# Patient Record
Sex: MALE | Race: WHITE | URBAN - METROPOLITAN AREA
[De-identification: names, ages, dates, MRNs, and addresses within clinical notes are randomized per-mention and may not be internally consistent; named-entity substitution may affect disease eponyms.]

---
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
## 2020-01-08 ENCOUNTER — OFFICE VISIT (OUTPATIENT)
Dept: SURGERY | Facility: CLINIC | Age: 20
End: 2020-01-08
Payer: COMMERCIAL

## 2020-01-08 VITALS — TEMPERATURE: 97 F | SYSTOLIC BLOOD PRESSURE: 125 MMHG | HEART RATE: 78 BPM | DIASTOLIC BLOOD PRESSURE: 80 MMHG

## 2020-01-08 DIAGNOSIS — K60.3 PERIANAL FISTULA: Primary | ICD-10-CM

## 2020-01-08 PROCEDURE — 99214 OFFICE O/P EST MOD 30 MIN: CPT | Performed by: SURGERY

## 2020-01-09 NOTE — PROGRESS NOTES
[facility]  Progress Note    John Smith Patient Status:  No patient class for patient encounter    4/15/2000 MRN [id number]   Location [address] [place] [address],[id number] [facility] Attending No att. providers found   Hosp Day # 0 PCP Diony Madrid the pilonidal sinus which I do not believe shows evident any evidence of inflammation at this time and I do not believe has anything to do with his chronic drainage. I spent  44  minutes face to face with the patient.  More than 50% of that time was spent

## 2020-01-17 ENCOUNTER — LAB REQUISITION (OUTPATIENT)
Dept: LAB | Facility: HOSPITAL | Age: 20
End: 2020-01-17
Payer: COMMERCIAL

## 2020-01-17 DIAGNOSIS — L05.01 PILONIDAL CYST WITH ABSCESS: ICD-10-CM

## 2020-01-17 PROCEDURE — 88304 TISSUE EXAM BY PATHOLOGIST: CPT | Performed by: SURGERY

## 2020-01-20 ENCOUNTER — TELEPHONE (OUTPATIENT)
Dept: SURGERY | Facility: CLINIC | Age: 20
End: 2020-01-20

## 2020-01-23 ENCOUNTER — MED REC SCAN ONLY (OUTPATIENT)
Dept: SURGERY | Facility: CLINIC | Age: 20
End: 2020-01-23

## 2020-01-31 ENCOUNTER — OFFICE VISIT (OUTPATIENT)
Dept: SURGERY | Facility: CLINIC | Age: 20
End: 2020-01-31

## 2020-01-31 VITALS — BODY MASS INDEX: 32.2 KG/M2 | WEIGHT: 230 LBS | HEIGHT: 71 IN | HEART RATE: 102 BPM | TEMPERATURE: 99 F

## 2020-01-31 DIAGNOSIS — L05.91 PILONIDAL CYST: Primary | ICD-10-CM

## 2020-01-31 PROCEDURE — 99024 POSTOP FOLLOW-UP VISIT: CPT | Performed by: SURGERY

## 2020-02-04 ENCOUNTER — MED REC SCAN ONLY (OUTPATIENT)
Dept: SURGERY | Facility: CLINIC | Age: 20
End: 2020-02-04

## 2020-07-15 ENCOUNTER — TELEPHONE (OUTPATIENT)
Dept: FAMILY MEDICINE CLINIC | Facility: CLINIC | Age: 20
End: 2020-07-15

## 2020-07-15 NOTE — TELEPHONE ENCOUNTER
Pt has Nausea, vomiting, headache, diarrhea, tight chest, chills, Body aches and feels over all bad,   Wants to know if he can get COVID Tested. Started today, wasn't feeling good all week but got worse today.      Doesn't think he was around anyone who h

## 2020-07-15 NOTE — TELEPHONE ENCOUNTER
Patient advised of the information per Dr. Seema Pavon. Patient did not want to schedule a visit at this time.

## 2020-07-15 NOTE — TELEPHONE ENCOUNTER
He has not been seen in over 3 years. Our options are to have him go to the [facility] and get tested. If they want me to order testing I will need to do a video visit. I could do that at noon tomorrow.   Thanks

## 2020-10-20 ENCOUNTER — MED REC SCAN ONLY (OUTPATIENT)
Dept: FAMILY MEDICINE CLINIC | Facility: CLINIC | Age: 20
End: 2020-10-20

## 2022-05-19 ENCOUNTER — OFFICE VISIT (OUTPATIENT)
Dept: SURGERY | Facility: CLINIC | Age: 22
End: 2022-05-19
Payer: COMMERCIAL

## 2022-05-19 VITALS — HEIGHT: 71 IN | BODY MASS INDEX: 32.2 KG/M2 | TEMPERATURE: 98 F | WEIGHT: 230 LBS

## 2022-05-19 DIAGNOSIS — L05.91 PILONIDAL CYST: Primary | ICD-10-CM

## 2022-05-19 RX ORDER — AMOXAPINE 100 MG/1
TABLET ORAL 2 TIMES DAILY
COMMUNITY

## 2024-09-03 NOTE — TELEPHONE ENCOUNTER
1/17 pilonidal cyst by [facility], pt wants to know if he can take Miralax - told him ok to take this. Pt states area is swollen, denies fever or chills. Informed some swelling is normal in the 1st post op week. Pt ALENA.
No

## (undated) NOTE — Clinical Note
Mahamed Granados saw Magdalena Luna in the office today. He presents with a draining wound longterm between his anus and a pilonidal sinus more cephalad. I am uncertain whether this represents a pilonidal abscess or a perianal abscess.   By location a perianal abscess woul